# Patient Record
Sex: FEMALE | Race: WHITE
[De-identification: names, ages, dates, MRNs, and addresses within clinical notes are randomized per-mention and may not be internally consistent; named-entity substitution may affect disease eponyms.]

---

## 2020-05-11 ENCOUNTER — HOSPITAL ENCOUNTER (OUTPATIENT)
Dept: HOSPITAL 94 - SSTAY O | Age: 60
Discharge: HOME | End: 2020-05-11
Attending: RADIOLOGY
Payer: COMMERCIAL

## 2020-05-11 VITALS — HEIGHT: 64 IN | BODY MASS INDEX: 33.05 KG/M2 | WEIGHT: 193.57 LBS

## 2020-05-11 DIAGNOSIS — Z85.72: ICD-10-CM

## 2020-05-11 DIAGNOSIS — Z79.899: ICD-10-CM

## 2020-05-11 DIAGNOSIS — Z98.890: ICD-10-CM

## 2020-05-11 DIAGNOSIS — J90: Primary | ICD-10-CM

## 2020-05-11 PROCEDURE — 32555 ASPIRATE PLEURA W/ IMAGING: CPT

## 2020-05-11 PROCEDURE — 71045 X-RAY EXAM CHEST 1 VIEW: CPT

## 2020-05-11 NOTE — NUR
Chest x ray final report viewed by Troy HOLLOWAY and patient cleared for discharge 
to home. Patient given dc instructions including warning signs and symptoms including 
increased sob and chest pain. States understands instructions. left floor ambulatory

## 2020-05-26 ENCOUNTER — HOSPITAL ENCOUNTER (OUTPATIENT)
Dept: HOSPITAL 94 - SSTAY O | Age: 60
Discharge: HOME | End: 2020-05-26
Attending: RADIOLOGY
Payer: COMMERCIAL

## 2020-05-26 VITALS — DIASTOLIC BLOOD PRESSURE: 75 MMHG | SYSTOLIC BLOOD PRESSURE: 136 MMHG

## 2020-05-26 VITALS — DIASTOLIC BLOOD PRESSURE: 72 MMHG | SYSTOLIC BLOOD PRESSURE: 108 MMHG

## 2020-05-26 VITALS — WEIGHT: 182.54 LBS | BODY MASS INDEX: 31.16 KG/M2 | HEIGHT: 64 IN

## 2020-05-26 VITALS — SYSTOLIC BLOOD PRESSURE: 126 MMHG | DIASTOLIC BLOOD PRESSURE: 70 MMHG

## 2020-05-26 DIAGNOSIS — J90: Primary | ICD-10-CM

## 2020-05-26 DIAGNOSIS — Z79.899: ICD-10-CM

## 2020-05-26 DIAGNOSIS — Z85.72: ICD-10-CM

## 2020-05-26 DIAGNOSIS — Z98.890: ICD-10-CM

## 2020-05-26 DIAGNOSIS — Z87.19: ICD-10-CM

## 2020-05-26 PROCEDURE — 76604 US EXAM CHEST: CPT
